# Patient Record
Sex: MALE | Race: WHITE
[De-identification: names, ages, dates, MRNs, and addresses within clinical notes are randomized per-mention and may not be internally consistent; named-entity substitution may affect disease eponyms.]

---

## 2018-02-20 ENCOUNTER — HOSPITAL ENCOUNTER (INPATIENT)
Dept: HOSPITAL 80 - FED | Age: 33
LOS: 1 days | Discharge: HOME | DRG: 519 | End: 2018-02-21
Attending: NEUROLOGICAL SURGERY | Admitting: NEUROLOGICAL SURGERY
Payer: COMMERCIAL

## 2018-02-20 DIAGNOSIS — M48.061: ICD-10-CM

## 2018-02-20 DIAGNOSIS — M51.16: Primary | ICD-10-CM

## 2018-02-20 DIAGNOSIS — G83.4: ICD-10-CM

## 2018-02-20 DIAGNOSIS — M53.3: ICD-10-CM

## 2018-02-20 LAB
INR PPP: 0.93 (ref 0.83–1.16)
PLATELET # BLD: 390 10^3/UL (ref 150–400)
PROTHROMBIN TIME: 12.7 SEC (ref 12–15)

## 2018-02-20 PROCEDURE — 0ST20ZZ RESECTION OF LUMBAR VERTEBRAL DISC, OPEN APPROACH: ICD-10-PCS | Performed by: NEUROLOGICAL SURGERY

## 2018-02-20 PROCEDURE — 00NY0ZZ RELEASE LUMBAR SPINAL CORD, OPEN APPROACH: ICD-10-PCS | Performed by: NEUROLOGICAL SURGERY

## 2018-02-20 PROCEDURE — 8E0WXBZ COMPUTER ASSISTED PROCEDURE OF TRUNK REGION: ICD-10-PCS | Performed by: NEUROLOGICAL SURGERY

## 2018-02-20 PROCEDURE — G0008 ADMIN INFLUENZA VIRUS VAC: HCPCS

## 2018-02-20 PROCEDURE — BR191ZZ FLUOROSCOPY OF LUMBAR SPINE USING LOW OSMOLAR CONTRAST: ICD-10-PCS | Performed by: NEUROLOGICAL SURGERY

## 2018-02-20 RX ADMIN — HYDROMORPHONE HYDROCHLORIDE PRN MG: 1 INJECTION, SOLUTION INTRAMUSCULAR; INTRAVENOUS; SUBCUTANEOUS at 18:42

## 2018-02-20 RX ADMIN — ACETAMINOPHEN SCH MG: 500 TABLET ORAL at 21:35

## 2018-02-20 RX ADMIN — OXYCODONE HYDROCHLORIDE PRN MG: 15 TABLET ORAL at 20:36

## 2018-02-20 RX ADMIN — HYDROMORPHONE HYDROCHLORIDE PRN MG: 1 INJECTION, SOLUTION INTRAMUSCULAR; INTRAVENOUS; SUBCUTANEOUS at 19:10

## 2018-02-20 RX ADMIN — METHOCARBAMOL PRN MG: 750 TABLET ORAL at 20:37

## 2018-02-20 RX ADMIN — Medication PRN MCG: at 18:52

## 2018-02-20 RX ADMIN — FAMOTIDINE SCH MG: 20 TABLET, FILM COATED ORAL at 20:37

## 2018-02-20 RX ADMIN — Medication SCH MLS: at 21:35

## 2018-02-20 RX ADMIN — Medication PRN MCG: at 18:42

## 2018-02-20 RX ADMIN — HYDROMORPHONE HYDROCHLORIDE PRN MG: 1 INJECTION, SOLUTION INTRAMUSCULAR; INTRAVENOUS; SUBCUTANEOUS at 18:52

## 2018-02-20 RX ADMIN — DOCUSATE SODIUM AND SENNOSIDES SCH TAB: 50; 8.6 TABLET ORAL at 20:36
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
## 2018-02-20 NOTE — PDHPUP
History & Physical Update


H&P update statement: 


This history and physical update is based on an assessment of the patient which 

was completed after admission or registration (within 24 hours), but prior to 

the surgery/procedure.





H&P update: H&P reviewed & patient examined, no change in patient's condition 

since H&P completed (patient presents with progressive 3-4 days of saddle 

anesthesia but no bowel or bladder incontinence.  huge L4/5 disch herniation 

with stenosis and compression.  risks and benefits of surgery versus no surgery 

discussed and they are willing to proceed with L4/5 laminectomy.  they 

understands he may not improve, may worsen, CSF leak, paralysis, stroke, death, 

require additional surgery, etc (including other complications).  they 

understand best chance of recovery is surgery.  consents have been signed and 

site marked.  all questions answered.)

## 2018-02-20 NOTE — PDANEPAE
ANE History of Present Illness


cauda equina syndrome, here for L4-5 TLIF, decompression








ANE Past Medical History





- Pulmonary History


Hx Oxygen in Use at Home: No


Hx Sleep Apnea: No





- Endocrine History


Hx Diabetes: No





ANE Review of Systems


Review of Systems: 








- Exercise capacity


Exercise capacity: >=4 METS





ANE Patient History





- Allergies


Allergies/Adverse Reactions: 








No Known Allergies Allergy (Unverified 02/20/18 12:07)


 








- Home Medications


Home Medications: 








Ibuprofen [Motrin (*)] 200 mg PO TID PRN 02/20/18 [Last Taken 02/19/18]








- NPO status


NPO Since - Liquids (Date): 02/19/18


NPO Since - Liquids (Time): 19:00


NPO Since - Solids (Date): 02/19/18


NPO Since - Solids (Time): 19:00





- Anes Hx


Anes Hx: no prior problems





- Smoking Hx


Smoking Status: Never smoked





- Alcohol Use


Alcohol Use: Rarely





- Family Anes Hx


Family Anes Hx: none





ANE Labs/Vital Signs





- Labs


Result Diagrams: 


 02/20/18 14:29





 02/20/18 14:29





- Vital Signs


Blood Pressure: 135/74


Heart Rate: 75


Respiratory Rate: 18


O2 Sat (%): 97


Height: 172.72 cm


Weight: 72.575 kg





ANE Physical Exam





- Airway


Neck exam: FROM


Mallampati Score: Class 2


Mouth exam: normal dental/mouth exam





- Pulmonary


Pulmonary: no respiratory distress, clear to auscultation





- Cardiovascular


Cardiovascular: regular rate and rhythym, no murmur, rub, or gallop





- ASA Status


ASA Status: I, E





ANE Anesthesia Plan


Anesthesia Plan: general endotracheal anesthesia

## 2018-02-21 VITALS
OXYGEN SATURATION: 96 % | DIASTOLIC BLOOD PRESSURE: 59 MMHG | SYSTOLIC BLOOD PRESSURE: 102 MMHG | TEMPERATURE: 98.1 F | HEART RATE: 72 BPM

## 2018-02-21 VITALS — RESPIRATION RATE: 16 BRPM

## 2018-02-21 RX ADMIN — METHOCARBAMOL PRN MG: 750 TABLET ORAL at 07:52

## 2018-02-21 RX ADMIN — ACETAMINOPHEN SCH MG: 500 TABLET ORAL at 05:19

## 2018-02-21 RX ADMIN — DOCUSATE SODIUM AND SENNOSIDES SCH TAB: 50; 8.6 TABLET ORAL at 09:54

## 2018-02-21 RX ADMIN — Medication SCH MLS: at 05:19

## 2018-02-21 RX ADMIN — OXYCODONE HYDROCHLORIDE PRN MG: 15 TABLET ORAL at 09:53

## 2018-02-21 RX ADMIN — OXYCODONE HYDROCHLORIDE PRN MG: 15 TABLET ORAL at 04:29

## 2018-02-21 RX ADMIN — FAMOTIDINE SCH MG: 20 TABLET, FILM COATED ORAL at 09:53

## 2018-02-21 NOTE — NEUSURGPN
Date of Surgery: 02/20/18


Post Op Day: 1


Assessment/Plan: 


Assessment: 33 yo M s/p L45 laminectomy and discectomy POD#1





Plan:


Pain well controlled with current oral medications


Avoid bending, twisting and lifting 


TEDs, SCDs, lovenox POD#1


PT/OT eval prior to discharge 


Patient will dc home today after PT/OT eval


Discussed patient with Dr Dinero


Please call neurosurgery with any questions/concerns 





Subjective: 


pain controlled, numbness still present in groin and quad 


Objective: 


AxO x3


PERRL


5/5 BLE


Numbness stable in perineal region and LLE 


Dressing-scant drainage


Neuro Check Frequency: per routine 


Urinary Catheter in Place: No





- Physician


Discussed Patient with Dr.: Dinero





Neurosurgery Physical Exam





- Vitals, I&O, Labs





 I and O











 02/20/18 02/21/18 02/22/18





 05:59 05:59 05:59


 


Intake Total  1120 


 


Output Total  500 


 


Balance  620 


 


Weight  72.575 kg 


 


Intake:   


 


  Oral (ml)  120 


 


  IV Intake (ml)  1000 


 


Output:   


 


  Urine (ml)  450 


 


    Urinal  450 


 


  Estimated Blood Loss (ml)  50 


 


Other:   


 


  Intake Quantity  Yes 





  Sufficient   


 


  Number of Voids   


 


    Urinal  1 


 


  Bladder Scan Volume (ml)   


 


    Urinal  458 








 Vital Signs











Temp Pulse Resp BP Pulse Ox


 


 36.7 C   72   16   102/59 L  96 


 


 02/21/18 07:40  02/21/18 07:40  02/21/18 07:40  02/21/18 07:40  02/21/18 07:40








 Laboratory Results





 02/20/18 14:29 





 02/20/18 14:29 











ICD10 Worksheet


Patient Problems: 


 Problems











Problem Status Onset


 


Cauda equina syndrome Acute

## 2025-02-04 NOTE — POSTOPPROG
Caller: Walker Ordoñez    Relationship: Self    Best call back number:   Telephone Information:   Mobile [phone]       Requested Prescriptions:   Requested Prescriptions     Pending Prescriptions Disp Refills    amLODIPine (NORVASC) 2.5 MG tablet 90 tablet 3     Sig: Take 1 tablet by mouth Daily.     THIS NEEDS TO BE 5 MG.  PATIENT STATES DALILA JIN UPPED THE DOSAGE    Pharmacy where request should be sent: [facility] [id number]  [place], [place] - [address] [facility] [address] [facility] [phone] [facility] [phone] FX     Last office visit with prescribing clinician: 5/7/2024   Last telemedicine visit with prescribing clinician: Visit date not found   Next office visit with prescribing clinician: 5/13/2025     Additional details provided by patient: HAS 1 WEEK LEFT    Does the patient have less than a 3 day supply:  [] Yes  [x] No    Would you like a call back once the refill request has been completed: [] Yes [] No    If the office needs to give you a call back, can they leave a voicemail: [] Yes [] No    Kareem Mcnulty Rep   02/04/25 10:50 EST            Post Op Note


Date of Operation: 02/20/18


Surgeon: Kathleen Morton


Assistant: SUHAS Morton PA-C


Anesthesiologist: Mehnaz


Anesthesia: GET(General Endotracheal)


Pre-op Diagnosis: cauda equina syndrome


Post-op Diagnosis: cauda equina syndrome


Indication: genital numbness, lumbar radiculopathy, disc herniation


Procedure: L4-5 laminectomy and discectomy


Findings: large disc herniation


Inf/Abcess present in the surg proc area at time of surgery?: No


Depth: Organ Space


EBL: 


Complications: 





none


Specimen(s): 





none





PA Addendum





- Addendum


.: 





S: Pt awake in PACU, denies back pain. Has continued foot numbness





O:


AAOx3


NAD


VSS


MAEx4


Motor 5/5 BUE/BLE


+LT


Incision dressed cdi





A: 31 yo M s/p L45 laminectomy and discectomy





P:


Pain management


Spine precautions


TEDs, SCDs, lovenox POD#1


PT/OT


Follow neuro exam


Call NS with any questions or concerns


D/w Dr Dinero